# Patient Record
Sex: FEMALE | Race: BLACK OR AFRICAN AMERICAN | NOT HISPANIC OR LATINO | URBAN - METROPOLITAN AREA
[De-identification: names, ages, dates, MRNs, and addresses within clinical notes are randomized per-mention and may not be internally consistent; named-entity substitution may affect disease eponyms.]

---

## 2017-09-27 ENCOUNTER — EMERGENCY (EMERGENCY)
Facility: HOSPITAL | Age: 22
LOS: 1 days | Discharge: HOME | End: 2017-09-27
Attending: EMERGENCY MEDICINE | Admitting: EMERGENCY MEDICINE
Payer: COMMERCIAL

## 2017-09-27 VITALS
SYSTOLIC BLOOD PRESSURE: 101 MMHG | HEART RATE: 81 BPM | TEMPERATURE: 99 F | RESPIRATION RATE: 18 BRPM | DIASTOLIC BLOOD PRESSURE: 69 MMHG | OXYGEN SATURATION: 97 % | WEIGHT: 132.28 LBS

## 2017-09-27 DIAGNOSIS — K04.7 PERIAPICAL ABSCESS WITHOUT SINUS: ICD-10-CM

## 2017-09-27 DIAGNOSIS — K08.89 OTHER SPECIFIED DISORDERS OF TEETH AND SUPPORTING STRUCTURES: ICD-10-CM

## 2017-09-27 PROCEDURE — 99283 EMERGENCY DEPT VISIT LOW MDM: CPT

## 2017-09-27 PROCEDURE — 99282 EMERGENCY DEPT VISIT SF MDM: CPT

## 2017-09-27 NOTE — ED ADULT NURSE NOTE - OBJECTIVE STATEMENT
Pt is C/O dental pain, left-sided, lower. Pt reports her dentist recommended wisdom tooth extraction x 4

## 2017-09-27 NOTE — ED PROVIDER NOTE - PROGRESS NOTE DETAILS
23y/o F no significant PMHx p/w small non drainable abscess in the medial aspect of tooth # 17 Spoke to oral service and referred to their office today Spoke to oral service Dr. Palacios and referred to their office today

## 2017-09-27 NOTE — ED PROVIDER NOTE - MEDICAL DECISION MAKING DETAILS
Pt will be discharged from ED and was referred to dental office and direction and address was provided and advised to see today

## 2017-09-27 NOTE — ED PROVIDER NOTE - ATTENDING CONTRIBUTION TO CARE
21 yo female c/o left tooth #17 pain/abscess since yesterday, no fever/chills.  No abx use.  +tooth #17 medial side early formation of abscess, no fluctuance, no active drainage.  Will f/u in Dr. Diego Palacios office upon discharge.  Discussed with assistant in office will prescribe antibiotics there.

## 2017-09-27 NOTE — ED PROVIDER NOTE - OBJECTIVE STATEMENT
21y/o female no significant PMHx p/w right lower tooth pain x 2 days. Lat dentist appt was 2 weeks ago, was advised to extract all the wisdom teeth. Denies fever/ chills/ drainage 21y/o female no significant PMHx p/w left lower tooth pain x 2 days. Last dentist appt was 2 weeks ago, was advised to extract all the wisdom teeth. Denies fever/ chills/ drainage

## 2018-10-08 ENCOUNTER — OUTPATIENT (OUTPATIENT)
Dept: OUTPATIENT SERVICES | Facility: HOSPITAL | Age: 23
LOS: 1 days | Discharge: HOME | End: 2018-10-08
Payer: COMMERCIAL

## 2018-10-30 DIAGNOSIS — F32.9 MAJOR DEPRESSIVE DISORDER, SINGLE EPISODE, UNSPECIFIED: ICD-10-CM

## 2018-12-07 LAB
ALBUMIN SERPL ELPH-MCNC: 3.9 G/DL — SIGNIFICANT CHANGE UP (ref 3.3–5)
ALP SERPL-CCNC: 44 U/L — SIGNIFICANT CHANGE UP (ref 40–120)
ALT FLD-CCNC: 14 U/L — SIGNIFICANT CHANGE UP (ref 4–33)
AST SERPL-CCNC: 16 U/L — SIGNIFICANT CHANGE UP (ref 4–32)
BASOPHILS # BLD AUTO: 0.03 K/UL — SIGNIFICANT CHANGE UP (ref 0–0.2)
BASOPHILS NFR BLD AUTO: 0.8 % — SIGNIFICANT CHANGE UP (ref 0–2)
BILIRUB SERPL-MCNC: 0.3 MG/DL — SIGNIFICANT CHANGE UP (ref 0.2–1.2)
BUN SERPL-MCNC: 11 MG/DL — SIGNIFICANT CHANGE UP (ref 7–23)
CALCIUM SERPL-MCNC: 8.7 MG/DL — SIGNIFICANT CHANGE UP (ref 8.4–10.5)
CHLORIDE SERPL-SCNC: 104 MMOL/L — SIGNIFICANT CHANGE UP (ref 98–107)
CHOLEST SERPL-MCNC: 130 MG/DL — SIGNIFICANT CHANGE UP (ref 120–199)
CO2 SERPL-SCNC: 27 MMOL/L — SIGNIFICANT CHANGE UP (ref 22–31)
CREAT SERPL-MCNC: 0.63 MG/DL — SIGNIFICANT CHANGE UP (ref 0.5–1.3)
EOSINOPHIL # BLD AUTO: 0.13 K/UL — SIGNIFICANT CHANGE UP (ref 0–0.5)
EOSINOPHIL NFR BLD AUTO: 3.6 % — SIGNIFICANT CHANGE UP (ref 0–6)
GLUCOSE SERPL-MCNC: 86 MG/DL — SIGNIFICANT CHANGE UP (ref 70–99)
HBA1C BLD-MCNC: 4.9 % — SIGNIFICANT CHANGE UP (ref 4–5.6)
HCT VFR BLD CALC: 36.6 % — SIGNIFICANT CHANGE UP (ref 34.5–45)
HDLC SERPL-MCNC: 57 MG/DL — SIGNIFICANT CHANGE UP (ref 45–65)
HGB BLD-MCNC: 11.6 G/DL — SIGNIFICANT CHANGE UP (ref 11.5–15.5)
IMM GRANULOCYTES # BLD AUTO: 0.01 # — SIGNIFICANT CHANGE UP
IMM GRANULOCYTES NFR BLD AUTO: 0.3 % — SIGNIFICANT CHANGE UP (ref 0–1.5)
LIPID PNL WITH DIRECT LDL SERPL: 68 MG/DL — SIGNIFICANT CHANGE UP
LYMPHOCYTES # BLD AUTO: 1.59 K/UL — SIGNIFICANT CHANGE UP (ref 1–3.3)
LYMPHOCYTES # BLD AUTO: 43.8 % — SIGNIFICANT CHANGE UP (ref 13–44)
MCHC RBC-ENTMCNC: 28.4 PG — SIGNIFICANT CHANGE UP (ref 27–34)
MCHC RBC-ENTMCNC: 31.7 % — LOW (ref 32–36)
MCV RBC AUTO: 89.7 FL — SIGNIFICANT CHANGE UP (ref 80–100)
MONOCYTES # BLD AUTO: 0.28 K/UL — SIGNIFICANT CHANGE UP (ref 0–0.9)
MONOCYTES NFR BLD AUTO: 7.7 % — SIGNIFICANT CHANGE UP (ref 2–14)
NEUTROPHILS # BLD AUTO: 1.59 K/UL — LOW (ref 1.8–7.4)
NEUTROPHILS NFR BLD AUTO: 43.8 % — SIGNIFICANT CHANGE UP (ref 43–77)
NRBC # FLD: 0 — SIGNIFICANT CHANGE UP
PLATELET # BLD AUTO: 294 K/UL — SIGNIFICANT CHANGE UP (ref 150–400)
PMV BLD: 12.2 FL — SIGNIFICANT CHANGE UP (ref 7–13)
POTASSIUM SERPL-MCNC: 4 MMOL/L — SIGNIFICANT CHANGE UP (ref 3.5–5.3)
POTASSIUM SERPL-SCNC: 4 MMOL/L — SIGNIFICANT CHANGE UP (ref 3.5–5.3)
PROT SERPL-MCNC: 6.7 G/DL — SIGNIFICANT CHANGE UP (ref 6–8.3)
RBC # BLD: 4.08 M/UL — SIGNIFICANT CHANGE UP (ref 3.8–5.2)
RBC # FLD: 13.4 % — SIGNIFICANT CHANGE UP (ref 10.3–14.5)
SODIUM SERPL-SCNC: 140 MMOL/L — SIGNIFICANT CHANGE UP (ref 135–145)
TRIGL SERPL-MCNC: 38 MG/DL — SIGNIFICANT CHANGE UP (ref 10–149)
TSH SERPL-MCNC: 1.01 UIU/ML — SIGNIFICANT CHANGE UP (ref 0.27–4.2)
VIT D25+D1,25 OH+D1,25 PNL SERPL-MCNC: 47.2 PG/ML — SIGNIFICANT CHANGE UP (ref 19.9–79.3)
WBC # BLD: 3.63 K/UL — LOW (ref 3.8–10.5)
WBC # FLD AUTO: 3.63 K/UL — LOW (ref 3.8–10.5)

## 2019-04-26 ENCOUNTER — TRANSCRIPTION ENCOUNTER (OUTPATIENT)
Age: 24
End: 2019-04-26

## 2019-08-03 ENCOUNTER — INPATIENT (INPATIENT)
Facility: HOSPITAL | Age: 24
LOS: 4 days | Discharge: HOME | End: 2019-08-08
Attending: PSYCHIATRY & NEUROLOGY | Admitting: PSYCHIATRY & NEUROLOGY
Payer: COMMERCIAL

## 2019-08-04 VITALS
DIASTOLIC BLOOD PRESSURE: 102 MMHG | SYSTOLIC BLOOD PRESSURE: 123 MMHG | OXYGEN SATURATION: 99 % | TEMPERATURE: 100 F | RESPIRATION RATE: 15 BRPM | HEART RATE: 81 BPM

## 2019-08-04 DIAGNOSIS — F32.2 MAJOR DEPRESSIVE DISORDER, SINGLE EPISODE, SEVERE WITHOUT PSYCHOTIC FEATURES: ICD-10-CM

## 2019-08-04 DIAGNOSIS — F12.20 CANNABIS DEPENDENCE, UNCOMPLICATED: ICD-10-CM

## 2019-08-04 DIAGNOSIS — Z90.49 ACQUIRED ABSENCE OF OTHER SPECIFIED PARTS OF DIGESTIVE TRACT: Chronic | ICD-10-CM

## 2019-08-04 LAB
ALBUMIN SERPL ELPH-MCNC: 4.5 G/DL — SIGNIFICANT CHANGE UP (ref 3.3–5)
ALP SERPL-CCNC: 47 U/L — SIGNIFICANT CHANGE UP (ref 40–120)
ALT FLD-CCNC: 16 U/L — SIGNIFICANT CHANGE UP (ref 4–33)
AMPHET UR-MCNC: NEGATIVE — SIGNIFICANT CHANGE UP
ANION GAP SERPL CALC-SCNC: 16 MMO/L — HIGH (ref 7–14)
APAP SERPL-MCNC: < 15 UG/ML — LOW (ref 15–25)
APPEARANCE UR: CLEAR — SIGNIFICANT CHANGE UP
AST SERPL-CCNC: 19 U/L — SIGNIFICANT CHANGE UP (ref 4–32)
BACTERIA # UR AUTO: NEGATIVE — SIGNIFICANT CHANGE UP
BARBITURATES UR SCN-MCNC: NEGATIVE — SIGNIFICANT CHANGE UP
BASOPHILS # BLD AUTO: 0.03 K/UL — SIGNIFICANT CHANGE UP (ref 0–0.2)
BASOPHILS NFR BLD AUTO: 0.4 % — SIGNIFICANT CHANGE UP (ref 0–2)
BENZODIAZ UR-MCNC: NEGATIVE — SIGNIFICANT CHANGE UP
BILIRUB SERPL-MCNC: 0.2 MG/DL — SIGNIFICANT CHANGE UP (ref 0.2–1.2)
BILIRUB UR-MCNC: NEGATIVE — SIGNIFICANT CHANGE UP
BLOOD UR QL VISUAL: NEGATIVE — SIGNIFICANT CHANGE UP
BUN SERPL-MCNC: 10 MG/DL — SIGNIFICANT CHANGE UP (ref 7–23)
CALCIUM SERPL-MCNC: 9.5 MG/DL — SIGNIFICANT CHANGE UP (ref 8.4–10.5)
CANNABINOIDS UR-MCNC: POSITIVE — SIGNIFICANT CHANGE UP
CHLORIDE SERPL-SCNC: 105 MMOL/L — SIGNIFICANT CHANGE UP (ref 98–107)
CO2 SERPL-SCNC: 19 MMOL/L — LOW (ref 22–31)
COCAINE METAB.OTHER UR-MCNC: NEGATIVE — SIGNIFICANT CHANGE UP
COLOR SPEC: YELLOW — SIGNIFICANT CHANGE UP
CREAT SERPL-MCNC: 0.62 MG/DL — SIGNIFICANT CHANGE UP (ref 0.5–1.3)
EOSINOPHIL # BLD AUTO: 0.1 K/UL — SIGNIFICANT CHANGE UP (ref 0–0.5)
EOSINOPHIL NFR BLD AUTO: 1.4 % — SIGNIFICANT CHANGE UP (ref 0–6)
ETHANOL BLD-MCNC: < 10 MG/DL — SIGNIFICANT CHANGE UP
GLUCOSE SERPL-MCNC: 95 MG/DL — SIGNIFICANT CHANGE UP (ref 70–99)
GLUCOSE UR-MCNC: NEGATIVE — SIGNIFICANT CHANGE UP
HCG SERPL-ACNC: < 5 MIU/ML — SIGNIFICANT CHANGE UP
HCT VFR BLD CALC: 40.4 % — SIGNIFICANT CHANGE UP (ref 34.5–45)
HGB BLD-MCNC: 13.3 G/DL — SIGNIFICANT CHANGE UP (ref 11.5–15.5)
HYALINE CASTS # UR AUTO: NEGATIVE — SIGNIFICANT CHANGE UP
IMM GRANULOCYTES NFR BLD AUTO: 0.6 % — SIGNIFICANT CHANGE UP (ref 0–1.5)
KETONES UR-MCNC: SIGNIFICANT CHANGE UP
LEUKOCYTE ESTERASE UR-ACNC: NEGATIVE — SIGNIFICANT CHANGE UP
LYMPHOCYTES # BLD AUTO: 1.67 K/UL — SIGNIFICANT CHANGE UP (ref 1–3.3)
LYMPHOCYTES # BLD AUTO: 24 % — SIGNIFICANT CHANGE UP (ref 13–44)
MCHC RBC-ENTMCNC: 29.6 PG — SIGNIFICANT CHANGE UP (ref 27–34)
MCHC RBC-ENTMCNC: 32.9 % — SIGNIFICANT CHANGE UP (ref 32–36)
MCV RBC AUTO: 90 FL — SIGNIFICANT CHANGE UP (ref 80–100)
METHADONE UR-MCNC: NEGATIVE — SIGNIFICANT CHANGE UP
MONOCYTES # BLD AUTO: 0.47 K/UL — SIGNIFICANT CHANGE UP (ref 0–0.9)
MONOCYTES NFR BLD AUTO: 6.8 % — SIGNIFICANT CHANGE UP (ref 2–14)
NEUTROPHILS # BLD AUTO: 4.65 K/UL — SIGNIFICANT CHANGE UP (ref 1.8–7.4)
NEUTROPHILS NFR BLD AUTO: 66.8 % — SIGNIFICANT CHANGE UP (ref 43–77)
NITRITE UR-MCNC: NEGATIVE — SIGNIFICANT CHANGE UP
NRBC # FLD: 0 K/UL — SIGNIFICANT CHANGE UP (ref 0–0)
OPIATES UR-MCNC: NEGATIVE — SIGNIFICANT CHANGE UP
OXYCODONE UR-MCNC: NEGATIVE — SIGNIFICANT CHANGE UP
PCP UR-MCNC: NEGATIVE — SIGNIFICANT CHANGE UP
PH UR: 6 — SIGNIFICANT CHANGE UP (ref 5–8)
PLATELET # BLD AUTO: 314 K/UL — SIGNIFICANT CHANGE UP (ref 150–400)
PMV BLD: 12.1 FL — SIGNIFICANT CHANGE UP (ref 7–13)
POTASSIUM SERPL-MCNC: 4.2 MMOL/L — SIGNIFICANT CHANGE UP (ref 3.5–5.3)
POTASSIUM SERPL-SCNC: 4.2 MMOL/L — SIGNIFICANT CHANGE UP (ref 3.5–5.3)
PROT SERPL-MCNC: 7 G/DL — SIGNIFICANT CHANGE UP (ref 6–8.3)
PROT UR-MCNC: 30 — SIGNIFICANT CHANGE UP
RBC # BLD: 4.49 M/UL — SIGNIFICANT CHANGE UP (ref 3.8–5.2)
RBC # FLD: 13.6 % — SIGNIFICANT CHANGE UP (ref 10.3–14.5)
RBC CASTS # UR COMP ASSIST: SIGNIFICANT CHANGE UP (ref 0–?)
SALICYLATES SERPL-MCNC: < 5 MG/DL — LOW (ref 15–30)
SODIUM SERPL-SCNC: 140 MMOL/L — SIGNIFICANT CHANGE UP (ref 135–145)
SP GR SPEC: 1.03 — SIGNIFICANT CHANGE UP (ref 1–1.04)
SQUAMOUS # UR AUTO: SIGNIFICANT CHANGE UP
TSH SERPL-MCNC: 1.68 UIU/ML — SIGNIFICANT CHANGE UP (ref 0.27–4.2)
UROBILINOGEN FLD QL: SIGNIFICANT CHANGE UP
WBC # BLD: 6.96 K/UL — SIGNIFICANT CHANGE UP (ref 3.8–10.5)
WBC # FLD AUTO: 6.96 K/UL — SIGNIFICANT CHANGE UP (ref 3.8–10.5)
WBC UR QL: SIGNIFICANT CHANGE UP (ref 0–?)

## 2019-08-04 RX ORDER — DIPHENHYDRAMINE HCL 50 MG
50 CAPSULE ORAL EVERY 8 HOURS
Refills: 0 | Status: DISCONTINUED | OUTPATIENT
Start: 2019-08-04 | End: 2019-08-08

## 2019-08-04 RX ORDER — HALOPERIDOL DECANOATE 100 MG/ML
2 INJECTION INTRAMUSCULAR EVERY 8 HOURS
Refills: 0 | Status: DISCONTINUED | OUTPATIENT
Start: 2019-08-04 | End: 2019-08-08

## 2019-08-04 RX ORDER — FLUOXETINE HCL 10 MG
60 CAPSULE ORAL ONCE
Refills: 0 | Status: DISCONTINUED | OUTPATIENT
Start: 2019-08-04 | End: 2019-08-06

## 2019-08-04 NOTE — ED PROVIDER NOTE - CLINICAL SUMMARY MEDICAL DECISION MAKING FREE TEXT BOX
hx depression w/ progressive sx now SI w/plan.  Will draw labs, HCG, tox & med clear if no concerning results that would preclude psych eval. hx depression w/ progressive sx now SI w/plan.  clinically sober,  no toxidrome, VS reassuring.  Will draw labs, HCG, tox & med clear if no concerning results that would preclude psych eval.

## 2019-08-04 NOTE — ED ADULT NURSE REASSESSMENT NOTE - NS ED NURSE REASSESS COMMENT FT1
Received report from night RN pt lying on bed in nad awaiting bed assignment, pt currently denies si/hi/avh presently  safety & comfort measures maintained eval on going.

## 2019-08-04 NOTE — ED BEHAVIORAL HEALTH ASSESSMENT NOTE - DETAILS
pt denies any active or passive SI "right now" but admits to active SI when she just arrived to ER "I did not like how it made me feel" sexual and physical, pt does not want to talk about it spoke with SUZY pt/friend

## 2019-08-04 NOTE — ED ADULT NURSE REASSESSMENT NOTE - NS ED NURSE REASSESS COMMENT FT1
pt calm & cooperative made aware of admission to [address], pt currently denies si/hi/avh presently pt transported in [facility] with security.

## 2019-08-04 NOTE — ED BEHAVIORAL HEALTH ASSESSMENT NOTE - SUICIDE PROTECTIVE FACTORS
Responsibility to family and others/Positive therapeutic relationships/Engaged in work or school/Supportive social network or family

## 2019-08-04 NOTE — ED BEHAVIORAL HEALTH ASSESSMENT NOTE - SUMMARY
24 years old female with HX of  MDD presents with severe depressive episode and active SI with plan to drive into brick wall When she was asked about suicidality she stated that she will not kill herself because if she does not succeed she will end up in the hospital    In process of changing her psych provider, under cover of a therapist now.

## 2019-08-04 NOTE — ED PROVIDER NOTE - PHYSICAL EXAMINATION
VS: afebrile, others reassuring  Gen: Well appearing in NAD  Head: NC/AT  HEENT: moist mucous membranes   Neck: trachea midline  Resp:  No distress  Ext: no deformities  Neuro:  A&Ox3  Skin:  Warm and dry as visualized  Psych:  blunted affect, quiet speech VS: afebrile, others reassuring  Gen: Well appearing in NAD  Head: NC/AT  Eye: EOMI, PERRLA 4mm b/l  HEENT: moist mucous membranes   Neck: trachea midline  Resp:  No distress  Ext: no deformities  Neuro:  A&Ox3, no focal deficits  Skin:  Warm and dry as visualized  Psych:  blunted affect, quiet but clear & coherent speech, clinically sober

## 2019-08-04 NOTE — ED BEHAVIORAL HEALTH ASSESSMENT NOTE - RISK ASSESSMENT
risk factors: active SI with plan HX of SI in the past, Mood episode  protective factors: supportive family , denies any active SI at present time Compliant with meds

## 2019-08-04 NOTE — ED BEHAVIORAL HEALTH NOTE - BEHAVIORAL HEALTH NOTE
Arianna's mother called multiple times ([phone]) requesting her daughter to be dced. Obtained verbal consent to speak to her mother about our assessment and treatment plan. Explained to her mother that an assessment was made and the plan is to admit Arianna to the unit and that she will be reassessed by the day team in the morning. Explained that she would not be dced today.

## 2019-08-04 NOTE — ED BEHAVIORAL HEALTH NOTE - BEHAVIORAL HEALTH NOTE
SW contacted pt's sister, Howard, at [phone] in order to obtain collateral information.  Pt's sister states that she came from [place] to visit pt and family.  Pt's sister reports that SW contacted pt's sister, Howard, at [phone] in order to obtain collateral information.  Pt's sister states that she came from [place] to visit pt and family.  Pt's sister reports that pt seems to be more depressed lately and reports that pt has been crying and talking about wanting to kill herself.  Pt's sister states that when she and pt had an argument earlier today which resulted in the pt lashing out at her and then crying quietly.  Pt's sister states that pt also spoke with her therapist Friday and Saturday as pt was having a difficult time.  She states that her therapist wanted to text her every day by 11:00, however pt did not do so.  As a result, pt's sister states her therapist contacted her to check to see if pt was OK.  Pt's sister reports that she was with pt at the time the therapist contacted her, and pt then spoke with her therapist.  Pt's sister reports that pt's therapist instructed her to take pt to the hospital for further evaluation as she felt as though pt required a further psychiatric assessment.  Pt's sister states that pt has spoken about wanting to kill herself, but has not spoken about any particular plan.  She states that pt has spoken about this more so within the last two years.  Pt's sister states that pt does not have any prior psychiatric hospitalizations.  She reports that pt is compliant with her therapist and seems to be compliant with her psychiatrist.  She states that pt is prescribed Prozac, possibly 40 mg.  Pt's sister also reports that pt smokes marijuana on a daily basis and that pt has reportedly increased her use of marijuana in recent weeks.  Pt's sister states that pt resides with her mother, brother, and great grandmother.  She states that pt assists in the care of her great grandmother during the week.

## 2019-08-04 NOTE — ED BEHAVIORAL HEALTH ASSESSMENT NOTE - HPI (INCLUDE ILLNESS QUALITY, SEVERITY, DURATION, TIMING, CONTEXT, MODIFYING FACTORS, ASSOCIATED SIGNS AND SYMPTOMS)
the patient is 24 years old single, childless, domiciled with her mother, PIEDAD (with dementia and brother; was brought to ER for worsening of SI with plan  to The patient is 24 years old single, childless, student at [employer], domiciled with her mother, PIEDAD (with dementia) and brother; Hx of cannabis dependance, no legal issues, no HX of violence, was sent to ER by her therapist for worsening of SI with plan  to drive into a brick wall.   Patient reports that her depression has been getting worse for the last week and lately she has been feeling very depressed, with low energy level; anhedonia and decreased concentration. She states that she has been feeling helpless and hopeless , but she is able to sleep well and has good appetite She admits to active SI with plan, she denies any active SI or plan but states that she will not do it because "If I am unsuccessful I might end up in place like this, I mean in the hospital" She states that she has been feeling suicidal in the past with plan to jump of the bridge, but she has never tried to commit SA . She does not know why her depressed. She states that she has been compliant with her meds  Patient denies any psychotic symptoms at present time and in the past She denies hearing voices, no visions or delusions  No manic symptoms No racing thoughts or pressured speech, no diminished need for sleep or goal directed activity, no sex inappropriate behavior The patient is 24 years old single, childless, student at [employer], domiciled with her mother, PIEDAD (with dementia) and brother; Hx of cannabis dependance, no legal issues, no HX of violence, was sent to ER by her therapist for worsening of SI with plan  to drive into a brick wall.   Patient reports that her depression has been getting worse for the last week and lately she has been feeling very depressed, with low energy level; anhedonia and decreased concentration. She states that she has been feeling helpless and hopeless , but she is able to sleep well and has good appetite She admits to active SI with plan, she denies any active SI or plan but states that she will not do it because "If I am unsuccessful I might end up in place like this, I mean in the hospital" She states that she has been feeling suicidal in the past with plan to jump of the bridge, but she has never tried to commit SA . She does not know why her depressed. She states that she has been compliant with her meds  Patient denies any psychotic symptoms at present time and in the past She denies hearing voices, no visions or delusions  No manic symptoms No racing thoughts or pressured speech, no diminished need for sleep or goal directed activity, no sexually inappropriate behavior. The patient is 24 years old single, childless, student at [employer],, domiciled with her mother, PIEDAD (with dementia) and brother; Hx of cannabis dependance, no legal issues, no HX of violence, was sent to ER by her therapist for worsening of SI with plan  to drive into a brick wall.   Patient reports that her depression has been getting worse for the last week and lately she has been feeling very depressed, with low energy level; anhedonia and decreased concentration. She states that she has been feeling helpless and hopeless , but she is able to sleep well and has good appetite She admits to active SI with plan, she denies any active SI or plan but states that she will not do it because "If I am unsuccessful I might end up in place like this, I mean in the hospital" She states that she has been feeling suicidal in the past with plan to jump of the bridge, but she has never tried to commit SA . She does not know why her depressed. She states that she has been compliant with her meds  Patient denies any psychotic symptoms at present time and in the past She denies hearing voices, no visions or delusions  No manic symptoms No racing thoughts or pressured speech, no diminished need for sleep or goal directed activity, no sexually inappropriate behavior.

## 2019-08-04 NOTE — ED ADULT TRIAGE NOTE - CHIEF COMPLAINT QUOTE
alert states she is feeling suicidal  x 1 week  has a plan  hx depression  smoked weed 90 minutes ago

## 2019-08-04 NOTE — ED PROVIDER NOTE - PRINCIPAL DIAGNOSIS
Suicidal ideation Current severe episode of major depressive disorder without psychotic features without prior episode

## 2019-08-04 NOTE — ED ADULT NURSE NOTE - NSIMPLEMENTINTERV_GEN_ALL_ED
Implemented All Universal Safety Interventions:  [place] to call system. Call bell, personal items and telephone within reach. Instruct patient to call for assistance. Room bathroom lighting operational. Non-slip footwear when patient is off stretcher. Physically safe environment: no spills, clutter or unnecessary equipment. Stretcher in lowest position, wheels locked, appropriate side rails in place.

## 2019-08-04 NOTE — ED BEHAVIORAL HEALTH ASSESSMENT NOTE - SUICIDE RISK FACTORS
Hopelessness/Substance abuse/dependence/Access to means (pills, firearms, etc.)/History of abuse/trauma

## 2019-08-04 NOTE — ED BEHAVIORAL HEALTH ASSESSMENT NOTE - OTHER PAST PSYCHIATRIC HISTORY (INCLUDE DETAILS REGARDING ONSET, COURSE OF ILLNESS, INPATIENT/OUTPATIENT TREATMENT)
no psych admissions HX of SI in the past with plan to jump of the bridge, no SA Patient started psychotherapuy in April 2017, psych meds from October 2018 Was under care of "a psychiatrist at [facility]" currently she is seeing a therapist at psych services at  and has an appointment with a psychiatrist there "sometime next week , they called me , I did not return the call yet" no psych admissions HX of SI in the past with plan to jump of the bridge, no SA Patient started psychotherapy in April 2017, psych meds from October 2018 Was under care of "a psychiatrist at [facility]" currently she is seeing a therapist at [facility] at  and has an appointment with a psychiatrist there "sometime next week , they called me , I did not return the call yet" no psych admissions HX of SI in the past with plan to jump of the bridge, no SA Patient started psychotherapy in April 2017, psych meds from October 2018 Was under care of Dr Collette Keys;  currently she is seeing a therapist Erica Streeter at psych [facility] at  and has an appointment with a psychiatrist there "sometime next week , they called me , I did not return the call yet"

## 2019-08-04 NOTE — ED ADULT NURSE NOTE - OBJECTIVE STATEMENT
Pt arrives to ED c/o depression with SI.  Pt belongings checked and secured by staff.  Pt checked by metal detector.  Pt changed into gown and scrubs.  Labs drawn and sent.  Pt denies audio/visual hallucinations and HI.  Pt awaiting Psych assessment.  Pt provided verbal support by RN.  Pt understands there resources available to help in case of feeling SI.  Will continue to monitor.

## 2019-08-04 NOTE — ED PROVIDER NOTE - CARE PLAN
Principal Discharge DX:	Suicidal ideation Principal Discharge DX:	Current severe episode of major depressive disorder without psychotic features without prior episode  Secondary Diagnosis:	Cannabis dependence  Secondary Diagnosis:	Suicidal ideation

## 2019-08-04 NOTE — ED PROVIDER NOTE - OBJECTIVE STATEMENT
24y F hx depression on Lexapro now sent in my therapist for worsening SI x1wk.  Pt initially had intermittent nonintrusive thoughts of wanting to die, but they have progressed to now wanting to kill herself & now has a plan to drive into a brick wall in order to die.  No pain of any kind.  No non-BH complaints in Emergency Department.

## 2019-08-04 NOTE — ED PROVIDER NOTE - PROGRESS NOTE DETAILS
Zvi Dubin, MD note: labs reassuring.  No toxidrome.  VS reassuring.  I see no medical reason that a  consult cannot take place.   box checked.   paged.  Consult placed.

## 2019-08-05 PROCEDURE — 99222 1ST HOSP IP/OBS MODERATE 55: CPT

## 2019-08-05 RX ORDER — FLUOXETINE HCL 10 MG
40 CAPSULE ORAL DAILY
Refills: 0 | Status: DISCONTINUED | OUTPATIENT
Start: 2019-08-06 | End: 2019-08-08

## 2019-08-05 RX ORDER — FLUOXETINE HCL 10 MG
40 CAPSULE ORAL ONCE
Refills: 0 | Status: COMPLETED | OUTPATIENT
Start: 2019-08-05 | End: 2019-08-05

## 2019-08-05 RX ADMIN — Medication 40 MILLIGRAM(S): at 13:08

## 2019-08-06 PROCEDURE — 99232 SBSQ HOSP IP/OBS MODERATE 35: CPT

## 2019-08-06 RX ADMIN — Medication 40 MILLIGRAM(S): at 08:53

## 2019-08-07 PROCEDURE — 99231 SBSQ HOSP IP/OBS SF/LOW 25: CPT

## 2019-08-07 RX ORDER — FLUOXETINE HCL 10 MG
1 CAPSULE ORAL
Qty: 28 | Refills: 0
Start: 2019-08-07 | End: 2019-09-03

## 2019-08-07 RX ORDER — IBUPROFEN 200 MG
400 TABLET ORAL ONCE
Refills: 0 | Status: DISCONTINUED | OUTPATIENT
Start: 2019-08-07 | End: 2019-08-08

## 2019-08-07 RX ADMIN — Medication 40 MILLIGRAM(S): at 08:38

## 2019-08-08 VITALS — RESPIRATION RATE: 18 BRPM | TEMPERATURE: 99 F

## 2019-08-08 RX ADMIN — Medication 40 MILLIGRAM(S): at 08:51

## 2019-08-27 PROBLEM — F32.9 MAJOR DEPRESSIVE DISORDER, SINGLE EPISODE, UNSPECIFIED: Chronic | Status: ACTIVE | Noted: 2019-08-04

## 2019-10-04 ENCOUNTER — APPOINTMENT (OUTPATIENT)
Dept: OBGYN | Facility: CLINIC | Age: 24
End: 2019-10-04

## 2020-03-31 ENCOUNTER — APPOINTMENT (OUTPATIENT)
Dept: OBGYN | Facility: CLINIC | Age: 25
End: 2020-03-31

## 2021-01-21 PROCEDURE — 99442: CPT

## 2021-04-21 PROCEDURE — 99213 OFFICE O/P EST LOW 20 MIN: CPT | Mod: 95

## 2021-06-10 PROCEDURE — 99213 OFFICE O/P EST LOW 20 MIN: CPT | Mod: 95

## 2021-06-21 PROBLEM — Z00.00 ENCOUNTER FOR PREVENTIVE HEALTH EXAMINATION: Status: ACTIVE | Noted: 2021-06-21

## 2021-08-16 PROCEDURE — 98968 PH1 ASSMT&MGMT NQHP 21-30: CPT

## 2021-09-10 PROCEDURE — 99213 OFFICE O/P EST LOW 20 MIN: CPT | Mod: 95

## 2021-12-10 PROCEDURE — 99442: CPT

## 2022-08-03 PROCEDURE — 99214 OFFICE O/P EST MOD 30 MIN: CPT | Mod: 95

## 2024-12-03 NOTE — ED ADULT TRIAGE NOTE - MEANS OF ARRIVAL
Recent esophagram showed mild narrowing at the GE junction with delayed passage of the 13 mm barium tablet.  The tablet did pass with 3 repeated swallows.  I called and discussed results with patient and recommended we proceed with EGD with dilation and patient is agreeable.  Orders have been placed. Patient would like this scheduled in December if possible due to insurance changes in January. If this is not currently available, please add her to a cancellation list in order to try and get her in before the end of the year.  Patient was advised to continue omeprazole 40 mg p.o. twice daily which has been helpful.  Will follow-up in clinic as scheduled.   ambulatory